# Patient Record
Sex: FEMALE | Race: WHITE | NOT HISPANIC OR LATINO
[De-identification: names, ages, dates, MRNs, and addresses within clinical notes are randomized per-mention and may not be internally consistent; named-entity substitution may affect disease eponyms.]

---

## 2023-08-09 ENCOUNTER — APPOINTMENT (OUTPATIENT)
Dept: PAIN MANAGEMENT | Facility: CLINIC | Age: 73
End: 2023-08-09
Payer: MEDICARE

## 2023-08-09 ENCOUNTER — NON-APPOINTMENT (OUTPATIENT)
Age: 73
End: 2023-08-09

## 2023-08-09 VITALS — DIASTOLIC BLOOD PRESSURE: 86 MMHG | SYSTOLIC BLOOD PRESSURE: 145 MMHG | HEIGHT: 68 IN

## 2023-08-09 DIAGNOSIS — M79.18 MYALGIA, OTHER SITE: ICD-10-CM

## 2023-08-09 DIAGNOSIS — M96.1 POSTLAMINECTOMY SYNDROME, NOT ELSEWHERE CLASSIFIED: ICD-10-CM

## 2023-08-09 DIAGNOSIS — Z86.39 PERSONAL HISTORY OF OTHER ENDOCRINE, NUTRITIONAL AND METABOLIC DISEASE: ICD-10-CM

## 2023-08-09 DIAGNOSIS — M48.02 SPINAL STENOSIS, CERVICAL REGION: ICD-10-CM

## 2023-08-09 DIAGNOSIS — Z78.9 OTHER SPECIFIED HEALTH STATUS: ICD-10-CM

## 2023-08-09 DIAGNOSIS — Z87.898 PERSONAL HISTORY OF OTHER SPECIFIED CONDITIONS: ICD-10-CM

## 2023-08-09 DIAGNOSIS — M79.2 NEURALGIA AND NEURITIS, UNSPECIFIED: ICD-10-CM

## 2023-08-09 DIAGNOSIS — H93.13 TINNITUS, BILATERAL: ICD-10-CM

## 2023-08-09 PROBLEM — Z00.00 ENCOUNTER FOR PREVENTIVE HEALTH EXAMINATION: Status: ACTIVE | Noted: 2023-08-09

## 2023-08-09 PROCEDURE — 99204 OFFICE O/P NEW MOD 45 MIN: CPT

## 2023-08-09 RX ORDER — GABAPENTIN 300 MG/1
300 CAPSULE ORAL
Refills: 0 | Status: ACTIVE | COMMUNITY

## 2023-08-09 RX ORDER — NAPROXEN SODIUM 220 MG
TABLET ORAL
Refills: 0 | Status: ACTIVE | COMMUNITY

## 2023-08-09 RX ORDER — ROSUVASTATIN CALCIUM 5 MG/1
5 TABLET, FILM COATED ORAL
Refills: 0 | Status: ACTIVE | COMMUNITY

## 2023-08-09 NOTE — ASSESSMENT
[FreeTextEntry1] : >> Imaging and Other Studies  I personally reviewed the relevant imaging.  Discussed and explained to patient the likely source of pathology and pain.  Questions answered MRI   >> Therapy and Other Modalities  start PT   >> Medications  acetaminophen 650mg q8h prn pain (caution <3g daily)  trial low dose naltrexone   >> Interventions  may consider intervention  >> Consults  na  >> Discussion of Risks/Benefits/Alternatives  	>Regarding any scheduled procedures:  I have discussed in detail with the patient that any interventional pain procedure is associated with potential risks.  The procedure may include an injection of steroids and potentially other medications (local anesthetic and normal saline) into the epidural space or surrounding tissue of the spine.  There are significant risks of this procedure which include and are not limited to infection, bleeding, worsening pain, dural puncture leading to postdural puncture headache, nerve damage, spinal cord injury, paralysis, stroke, and death.    There is a chance that the procedure does not improve their pain.    There are risks associated with the steroid being absorbed into the body systemically.  These include dysphoria, difficulty sleeping, mood swings and personality changes.  Premenopausal women may notice an irregularity in her menstrual cycle for 2-3 months following the injection.  Steroids can specifically affect patients with hypertension, diabetes, and peptic ulcers.  The procedure may cause a temporary increase in blood pressure and blood pressure, and may adversely affect a peptic ulcer.  Other, more rare complications, include avascular necrosis of joints, glaucoma and worsening of osteoporosis.   I have discussed the risks of the procedure at length with the patient, and the potential benefits of pain relief.  I have offered alternatives to the procedure.  All questions were answered.    The patient expressed understanding and wishes to proceed with the procedure.  	>Regarding COVID19 Pandemic:   Any planned interventional pain procedure are scheduled because further delay may cause harm or negative outcome to patient.  The goal in performing this procedure is to avoid deterioration of function, emergency room visits (which increases exposure) and reliance on opioids.    r/b/a discussed with patient, lack of evidence to conclusively determine whether pain management procedures have any positive or negative impact on the possibility of daniela the virus and/or development of any sequelae.   Patient counselled regarding timing steroid based intervention 2 weeks before or after COVID-19 vaccine administration to avoid any interaction or affect on efficacy of vaccination  Patient demonstrates understanding  Informed patient that risks associated with the COVID-19 infection.  Informed patient steps taken to limit the risks.  We are implementing safety precautions and following protocols consistent with the CDC and state recommendations. All patients and staff will be checked for fever or signs of illness upon entry to the facility. We will limit our steroid dose to the lowest effective therapeutic dose or in some cases steroids will not be injected at all.   Patient agrees to proceed  >> Conclusion  The above diagnosis and treatment plan is medically reasonable and necessary based on the patient encounter  There were no barriers to communication. Informed patient that I would be available for any additional questions. Patient was instructed to call with any worsening symptoms including severe pain, new numbness/weakness, or changes in the bowel/bladder function.  Discussed role of nsaids in pain management and all relevant risks, if patient is continuing to require after 4 weeks the patient should f/u for alternative treatment.  Instructed patient to maintain pain diary to monitor pain level, mobility, and function.  The referring provider was informed of the above diagnosis and treatment plan.

## 2023-08-09 NOTE — REVIEW OF SYSTEMS
[Back Pain] : back pain [Neck Pain] : neck pain [Muscle Pain] : muscle pain [Joint Pain] : joint pain [Negative] : Heme/Lymph

## 2023-08-09 NOTE — HISTORY OF PRESENT ILLNESS
[Neck Pain] : neck pain [___ yrs] : [unfilled] year(s) ago [Constant] : constant [8] : a minimum pain level of 8/10 [10] : a maximum pain level of 10/10 [Sharp] : sharp [Aching] : aching [Turning Head] : turning head [Medications] : medications [Other: ___] : [unfilled] [FreeTextEntry1] : HPI     Ms. HIRAL BERRY is a 72 year F with pmhx of multiple cervical surgeries (fusion C2-T2), lumbar surgeries (no hardware), chronic UTI's r/t neurogenic bladder. C/o neck pain that radiates to left medial arm to digits. In addition, c/o lower back pain that radiates to left buttock. Denies leg pain but notes left foot pain and numbness. Reports temperature changes in her extremities and decreased sensation. Pain worst with activity. Impact's ability to perform ADL's and function. Denies any additional weakness, numbness, bowel/bladder dysfunction, history of bleeding disorders.   Previous and current pain medications/doses/effects: Gabapentin TDD 1500mg-1800mg      Previous Pain Treatments:   consider PT  Previous Pain Injections:   previous intervention  Previous Diagnostic Studies/Images:  8/4/23 EXAM: MR LUMBAR W/O LACI  CLINICAL HISTORY: 72 years-old with lumbar back pain. M54.5  COMPARISON: No prior studies available for comparison.  TECHNIQUE: Sagittal STIR, T1 & T2, coronal T2 and axial T2 weighted magnetic resonance images were obtained of the lumbar spine on a Siemens 3T MR.  FINDINGS: There is normal marrow signal noted within the vertebral bodies. Vertebral body height appears preserved Visualized portions of the thoracic cord, conus and cauda equina appear unremarkable. The CSF space shows normal signal intensity. There is no evidence of increased signal within the interspinous ligamentous structures. Preservation of the normal lordotic curve of the lumbar spine is noted.  Anatomic facet alignment is noted.  Examination of L1-L2 demonstrate(s) no significant disc bulge, protrusion or hemiation. There is normal disc space height and intra disc signal. No significant central canal or foraminal stenosis is identified.  Examination of L2-L3 shows broad-based posterior disc bulge, ligamentum flavum thickening and facet arthropathy resulting in moderate central canal stenosis with mild bilateral foraminal stenosis.  Examination of L3-L4 shows loss of disc space height and signal consistent with desiccation. Broad-based posterior disc bulge, ligamentum flavum thickening and facet arthropathy result in moderate central canal stenosis with mild right and moderate left foraminal stenosis.  Examination of L4-L5 shows the patient status post laminectomy. There is diminished disc space and signal consistent with desiccation. Broad-based posterior disc bulge and central HNP results in recurrent/residual mild central canal stenosis with moderate to severe bilateral foraminal stenosis.  Examination of L5-S1 shows the patient status post laminectory. There is broad-based posterior disc bulge noted without residual central canal stenosis. However, foraminal disc protrusions results in moderate bilateral foraminal stenosis.  IMPRESSION:  Status post lumbar decompression with central canal and foraminal stenosis as described above. ---------------------------------------------------------------------------------------------------------------------------------------------  11/6/2020 13:38 EST CT Cervical Spine WO Tony Wang MD  STATUS  Auth (Verified)  Reason For Exam  (CT Cervical Spine WO) cervicalgia  Report  PROCEDURE: Computed Tomography Cervical Spine Without Contrast  CLINICAL HISTORY: Severe Pain Burning  SCRIPT INFORMATION: M54.2  COMPARISON: CT of the cervical spine dated 7/31/2017  TECHNIQUE:  Computed Tomography of the cervical spine was performed without the administration of contrast. Two dimensional reformatted imaging was obtained in sagittal and coronal planes.  FINDINGS:  CRANIAL VAULT/SKULL BASE/SINUSES:  The visualized intracranial structures are grossly unremarkable.  The visualized skull base structures, mastoid air cells and paranasal sinuses are unremarkable.  FINDINGS:  There has been interval revision of the cervical spine hardware since the prior examination. There are laminectomies from C3 to C7. There is posterior fusion extending from C2 to T2 utilizing paired vertical interconnecting rods. There are paired laminar screws at the C4, C5 and C6 levels and bilateral transpedicular screws at T2. There are also paired screws at C2.  The hardware appears to be grossly intact. There is artifact from the hardware which limits assessment of the central canal and the neural foramina. Within this limitation, there does not appear to be any significant bony central canal stenosis. There is left neural foraminal stenosis at C5-C6 and C6-C7 due to uncovertebral hypertrophy.  Mild intervertebral disc space narrowing at C5-C6 and C6-C7 is similar to the prior examination. Disc spaces are otherwise preserved.  There is reversal of the expected cervical lordosis.  There is no evidence for acute fracture.  Bony mineralization is within normal limits.  The paraspinal and prevertebral soft tissues demonstrate postoperative scarring similar to prior examination.  The visualized lung apices are clear.  Cervical lymph nodes are increased in number bilaterally but are not enlarged.  IMPRESSION:  Postsurgical changes extending from C2 to T2, without CT evidence for hardware compromise. There is reversal of the expected cervical lordosis, similar to the previous examination. No definite central canal stenosis. Left neural foraminal stenosis at C5-C6 and C6-C7.

## 2023-08-09 NOTE — PHYSICAL EXAM
[Normal muscle bulk without asymmetry] : normal muscle bulk without asymmetry [Facet Tenderness] : facet tenderness [Spine: Flexion to ___ degrees, without pain] : spine: flexion to [unfilled] degrees, without pain [] : Motor: [Normal] : Normal affect

## 2024-12-17 ENCOUNTER — NON-APPOINTMENT (OUTPATIENT)
Age: 74
End: 2024-12-17

## 2025-05-02 ENCOUNTER — NON-APPOINTMENT (OUTPATIENT)
Age: 75
End: 2025-05-02